# Patient Record
Sex: MALE | Race: WHITE | URBAN - METROPOLITAN AREA
[De-identification: names, ages, dates, MRNs, and addresses within clinical notes are randomized per-mention and may not be internally consistent; named-entity substitution may affect disease eponyms.]

---

## 2017-03-23 ENCOUNTER — VIRTUAL VISIT (OUTPATIENT)
Dept: FAMILY MEDICINE | Facility: OTHER | Age: 27
End: 2017-03-23

## 2017-03-23 NOTE — PROGRESS NOTES
Date:   Clinician: Eleni Levine  Clinician NPI: [id number]  Patient: Odin Borges  Patient : 1990  Patient Address: [address] [place] [address]. [place], [place] [address]  Patient Phone: [phone]  Visit Protocol: URI  Patient Summary:  Odin is a 26 year old ( : 1990 ) male who initiated a Zip with a URI.      His symptoms started suddenly 3-6 days ago and consist of myalgias, malaise, fever, loss of appetite, cough, itchy eyes, chills, rhinitis, post-nasal drainage, nasal congestion, and hoarse voice.   He denies ear pain, chest pain, nausea, vomiting, dyspnea, dysphagia, sore throat, and headache. He denies a history of facial surgery.   His moderate nasal secretions are yellow. His mild facial pain or pressure feels worse when bending over or leaning forward and is located on one side of his head. The facial pain or pressure started after the onset of other URI symptoms.  Odin does not have a history of migraine headaches. When asked to feel his neck he reported enlarged lymph nodes. Odin noted that the enlarged neck lymph nodes were first noticed when prompted to check for lymphadenopathy. He cannot tell if axillary lymphadenopathy is present.   His moderately severe (cough every 5-10 minutes) productive cough is more bothersome at night. He believes the cough is caused by post-nasal drainage. His cough produces white sputum.   He has passed urine in the past 12 hours. He denies rigors.   Odin denies having COPD or other chronic lung disease.   Pulse: Not measured beats in 10 seconds.    Weight (in lbs): 250   Odin does not smoke or use smokeless tobacco.   MEDICATIONS:  Guaifenesin + dextromethorphan (Robitussin DM, Mytussin DM, Mucinex DM), ibuprofen (Advil, Motrin), pseudoephedrine (Sudafed, Dimetapp), and diphenhydramine (Benadryl)   Patient free text response:  Adderall, Allegra  , ALLERGIES:  NKDA   Clinician Response:  Dear Odin,  Based on the information  you have provided, you likely have Influenza, otherwise known as 'the flu.'  Because the flu is easily spread, other members of your family or close personal contacts should consider taking steps to prevent Influenza. They can use [employer] to access influenza prevention treatments. Antiviral medications can be used to treat or prevent influenza, but are only effective when started within the first 48 hours of symptoms.   Unless your are allergic to the over-the-counter medication(s) below, I recommend using:   A decongestant such as pseudoephedrine (Sudafed or store brand) to help your symptoms. This is an over-the-counter medication that does not require a prescription.   Ibuprofen. Take 1-3 200mg tablets (200-600mg) every 8 hours to help with the discomfort. Make sure to take the ibuprofen with food. Do not exceed 2400mg in 24 hours. This is an over-the-counter medication that does not require a prescription.   Although your symptoms are consistent with Influenza, antiviral medications are not appropriate since you have had symptoms too long. Antiviral medications are only effective when started within 48 hours of the first flu symptoms.   Drink plenty of liquids, especially water and take time to rest your body. This may mean taking a nap or going to bed earlier. Your body is fighting an infection and liquids and rest will improve the pace of recovery. Remember to regularly wash your hands and avoid close contact with others to prevent spreading your infection.   Diagnosis: Influenza  Diagnosis ICD: J11.1  Additional Clinician Notes: Odin  our records indicate you were advised to be seen in clinic twice earlier today. Your symptoms are likely related to the flu but if you have any new or worsening symptoms or if you are having rigors please follow up in the clinic as soon as possible. Thank you for using [employer].

## 2022-06-15 ENCOUNTER — TELEPHONE (OUTPATIENT)
Dept: MATERNAL FETAL MEDICINE | Facility: CLINIC | Age: 32
End: 2022-06-15

## 2022-06-15 DIAGNOSIS — Z31.448 ENCOUNTER FOR OTHER GENETIC TESTING OF MALE FOR PROCREATIVE MANAGEMENT: Primary | ICD-10-CM

## 2022-06-15 NOTE — TELEPHONE ENCOUNTER
Linda 15, 2022    Attempted to call Odin to coordinate carrier screening as part of his partner's ongoing pregnancy care. Unavailable and unable to leave a message.     Roz Rodriguez MS, [facility]  Licensed Genetic Counselor  [facility]  Maternal Fetal Medicine  [email or web address]  [phone]

## 2022-06-16 NOTE — TELEPHONE ENCOUNTER
June 16, 2022    Odin called me and we reviewed the following:      We discussed that expanded carrier screening is designed to identify carrier status for conditions that are primarily childhood or adolescent onset. Expanded carrier screening does not evaluate for adult-onset conditions such as hereditary cancer syndromes, dementia/ Alzheimer's disease, or cardiovascular disease risk factors. Additionally, expanded carrier screening is not comprehensive for all known genetic diseases or inherited conditions. It will not intentionally screen for autosomal dominant conditions. This is a screening test, and residual carrier status risk figures will be provided to the patient after results become available. Carrier screening is not meant to diagnose the patient with a condition, and generally carriers are asymptomatic. However, certain genes may confer increased risks for various health concerns in carriers (including, but not limited to: BELLA, DMD, FMR1).       We reviewed that carrier screening will report on variants classified by the lab as pathogenic or likely pathogenic. Although carrier status does not change over time, it is possible that a variant could be reclassified as more information about the variant is learned. If this occurs, the couple will be contacted and a new risk assessment will be provided.     Odin was comfortable proceeding with expanded carrier screening for 289 conditions. He will plan to have his blood drawn next week at our [place] location. He gave verbal permission for his results to be shared with his partner, Yuliya.     Odin also shared that in addition to his mother and maternal aunt with breast cancer,     Roz Rodriguez MS, [facility]  Licensed Genetic Counselor  [facility]  Maternal Fetal Medicine  [email or web address]  [phone]

## 2022-06-20 ENCOUNTER — LAB (OUTPATIENT)
Dept: LAB | Facility: CLINIC | Age: 32
End: 2022-06-20

## 2022-06-20 DIAGNOSIS — Z31.448 ENCOUNTER FOR OTHER GENETIC TESTING OF MALE FOR PROCREATIVE MANAGEMENT: ICD-10-CM

## 2022-06-20 PROCEDURE — 36415 COLL VENOUS BLD VENIPUNCTURE: CPT

## 2022-06-27 ENCOUNTER — TELEPHONE (OUTPATIENT)
Dept: MATERNAL FETAL MEDICINE | Facility: CLINIC | Age: 32
End: 2022-06-27

## 2022-06-27 NOTE — TELEPHONE ENCOUNTER
June 27, 2022     I called Odin and his partner Yuliya to discuss the results of their carrier screening.      The couple did not mutually screen positive for any of the conditions assessed, significantly reducing the reproductive risk.  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 Cilia are involved with cell movement and signaling.     Symptoms of BBS generally include johnathan-cone dystrophy, which leads to progressive vision loss, kidney anomalies, genital differences, intellectual disability, infertitiligy, and polydactyly. Some individuals with this condition may have internal organs that are reversed (either situs inversus totalis or heterotaxy).    Symptoms of BBS can vary widely and some individuals may not have obvious symptoms.    Since Odin was NOT identified to be a carrier of this condition, the residual reproductive risk is 1 in 141,200.     GJB2-related conditions (c.35del):    GJB2-related conditions include autosomal recessive nonsyndromic deafness (DFNB1) and autosomal dominant nonsyndromic deafness (DFNA3) along with severe conditions involving deafness and skin findings. Severity of hearing loss can vary, even among affected individuals in the same family.    This particular variant is expected to be associated with DFNB1 and NOT DFNA3.    Some individuals with a GJB2 variant may also have nonsyndromic deafness due to a deletion on the other chromosome that includes an portion of DNA upstream of GJB2 that encompasses part of GJB6.     Since Odin was NOT identified to be a carrier for this condition, the couple's residual reproductive risk is 1 in 19,600.    [facility] will offer free diagnostic testing for GJB2 that includes an assessment of the common deletions involving GJB6 if the couple desires that  additional risk assessment. The couple would like to pursue testing if sample for Odin remains available.      Mucopolysaccharidosis type I (IDUA: c.1205G>A):    Mucopolysaccharidosis (MPS) refers to a group of condition in which the body is not able to break down certain large sugar molecules. This group of conditions are lysosomal storage disorders.     MPSI impacts many parts of the body, including the heart and skeleton. There is a severe form and an attenuated form.    The severe form usually presents with macrocephaly, heart valve anomalies, coarse facial features, and an enlarged liver and spleen. It progresses quickly and can also lead to intellectual disability as well as vision and hearing concerns.     The attenuated form typically presents in childhood and progresses more slowly. It is not always associated with cognitive differences.     Treatments include stem cell transplant and enzyme replacement therapy. However, there is no cure.     Since Odin was NOT identified to be a carrier of this condition, the couple's residual reproductive risk is 1 in 19,600.     Propionic acidemia (PCCA: c.1430G>T):    This is a condition in which the body cannot break down certain fats and proteins. Symptoms and age of onset can be variable.     The classic  form presents with poor feeding, hypotonia, lethargy, and encephalopathy. If untreated, seizures, coma, and death can occur. For those individuals that survive the first metabolic crisis, long-term complications can include poor growth, cognitive delays, pancreatitis, seizures, and cardiomyopathy.     The more mild form of the condition presents later in live with intermittent symptoms triggered by illness.     Treatment includes dietary modification and with good management, individuals can have a good prognosis and normal lifespan.     Since Odin was NOT identified to be a carrier of this condition, the couple's residual reproductive risk is 1 in  22,300.      BZE05M-bemnjdf conditions (WNT10A: c.682T>A):    These conditions  include autosomal recessive odonto-onycho-dermal dysplasia (OODD) and Hlwözv-Adklzy-Kqrkfzwi syndrome (SSPS) and autosomal dominant isolated tooth agenesis. People who are carriers of the autosomal recessive conditions may be at risk to develop the autosomal dominant condition.     OODD and SSPS refer to a spectrum of features associated with ectodermal dysplasia (ED), which causes abnormal development of the skin, hair, nails, teeth, and sweat glands. Isolated tooth agenesis results in the absence of one or more teeth, typically secondary teeth. Some individuals with tooth agenesis can have mild symptoms of ED.    This particular variant is a low penetrance variant, meaning that not all individuals with this variant will have associated symptoms.     Since Odin was NOT identified to be a carrier of this condition, the couple's residual reproductive risk is 1 in 121,600.    Yuliya denies any history of tooth or skin concerns.      Any of the couple's children will have a 50% chance of being carriers of these conditions. Since WNT10A has a carrier risk, we reviewed how this could impact Yuliya's children. This is also true for the couple's siblings. Other family members could also be at risk to be carriers and the couple was encouraged to share this information with their families.      Juliana were identified to carry pseuodeficiency alleles. Pseudodeficiency alleles are NOT associated with carrier status or disease, but can exhibit false positive results on biochemical tests such as  screening.     Yuliya was identified to carry the non-coding GALT variant known as the Yeager variant (c.-119_-116del). This variant is classified as benign by [employer]. In combination with a classic GALT variant, it may reduce enzyme activity which can trigger a positive for galactosemia on  screening. However, it does not require  dietary intervention and does not cause significant clinical consequences.      Roz Rodriguez MS, [facility]  Licensed Genetic Counselor  [facility]  Maternal Fetal Medicine  [email or web address]  [phone]

## 2022-07-05 ENCOUNTER — TELEPHONE (OUTPATIENT)
Dept: MATERNAL FETAL MEDICINE | Facility: CLINIC | Age: 32
End: 2022-07-05

## 2022-07-05 LAB — SCANNED LAB RESULT: NORMAL

## 2022-07-05 NOTE — TELEPHONE ENCOUNTER
July 5, 2022    I called Odin to discuss the results of the follow-up testing performed for GJB2, which includes analysis of the upstream GJB6 deletions. Unfortunately, his voicemail was full and I was unable to leave a message. I called his partner, Yuliya, to disclose the results.     No additional variants were identified in GJB2, including known GJB6 deletions. This further reduces the couple's reproductive risk for GJB2-related conditions.     Roz Rodriguez MS, [facility]  Licensed Genetic Counselor  [facility]  Maternal Fetal Medicine  [email or web address]  [phone]

## 2022-07-10 ENCOUNTER — HEALTH MAINTENANCE LETTER (OUTPATIENT)
Age: 32
End: 2022-07-10

## 2022-09-11 ENCOUNTER — HEALTH MAINTENANCE LETTER (OUTPATIENT)
Age: 32
End: 2022-09-11

## 2023-07-29 ENCOUNTER — HEALTH MAINTENANCE LETTER (OUTPATIENT)
Age: 33
End: 2023-07-29